# Patient Record
(demographics unavailable — no encounter records)

---

## 2024-10-29 NOTE — HISTORY OF PRESENT ILLNESS
[FreeTextEntry1] : 49 year para 3 ( x3) presents with complaints of pain with urination for the past year. She takes tylenol and motrin PRN for the pain.   Pelvic organ prolapse: no bulge, no pressure/heaviness  Stress urinary incontinence: 2-3 x/week no prior incontinence procedures  Overactive bladder syndrome: daily frequency 4-5 x/day, 1 x/night, occasional urgency,  14+ x/week UUI episodes, 1 pads/day     Bladder irritants include tea,    Prior OAB meds no  Voiding dysfunction: no Incomplete bladder emptying, no hesitancy  Lower urinary tract/vaginal symptoms: no UTIs per year, no hematuria, + dysuria, ? bladder pain  2-3 BM/week   occasional constipation (occasional metamucil)   Fecal incontinence no  Sexually active no   Pelvic pain +   Vaginal dryness no   LMP 2024   PMB no  PMSH: 2024 - Dr. Kebede: TLH/BS

## 2024-10-29 NOTE — COUNSELING
[FreeTextEntry1] : Please follow up with the physician assistant in about 3 months.  Please decrease or stop the use of the followin. Coffee (Caffeinated and decaffeinated)  2. Teas (Caffeinated, decaffeinated, Ice tea, and green teas  3. All sodas (Caffeinated, decaffeinated, energy drinks)  4. All carbonated drinks including seltzer water  5. All citric fruit juices  6. Water with lemon or lime  7. Spicy foods  8. Tomato Sauce based foods  9. Chocolate and chocolate containing products  10. All alcohol  You can take Azo PRN and cystex PRN (over the counter).

## 2024-10-29 NOTE — REASON FOR VISIT
[TextEntry] : Reason for visit: New Patient Voids per day:  4-5  Voids per night:  1  Urge incontinence: Yes (+) leakage w/o warning Occasional (+) urgency Stress incontinence: Yes Constipation: Yes, takes metamucil prn Fecal incontinence: No Vaginal bulge: No

## 2024-10-29 NOTE — PHYSICAL EXAM
[Chaperone Present] : A chaperone was present in the examining room during all aspects of the physical examination [[id number]] : A chaperone was present during the pelvic exam. [FreeTextEntry2] : Liana [FreeTextEntry1] : Void:  450cc  PVR:  30cc  Urethra was prepped in sterile fashion and then a sterile 14F catheter was used by me to drain the bladder for her symptoms of urgency. Patient tolerated the procedure well  -empty cough stress test  +atrophy  -urethral caruncle  -vestibular tenderness  +stage 1 prolapse  +urethral hypermobility  -pelvic floor dysfunction  -urethral tenderness  -bladder tenderness  surgically absent cervix  surgically absent uterus  adnexa nonpalpable  intact sacral nerves  1/5 Janeth

## 2024-10-29 NOTE — ASSESSMENT
[FreeTextEntry1] : Dysuria, bladder/pelvic pain - Discussed possible etiologies. Will f/u UA and UCx. Discussed likely IC/BPS. Discussed tx options. Will start with bladder diet (extensive counseling provided), Azo and cystex PRN. Will return in 3 months. If continues to have sxs will offer further IC therapies.  Urinary urgency and UUI - Acute onset - will f/u on UA and UCx. Will reevaluate at the next visit - if continues will discuss OAB management options.  DAVID - Not very bothersome to the patient. Will reevaluate PRN.